# Patient Record
Sex: MALE | Race: WHITE | URBAN - METROPOLITAN AREA
[De-identification: names, ages, dates, MRNs, and addresses within clinical notes are randomized per-mention and may not be internally consistent; named-entity substitution may affect disease eponyms.]

---

## 2017-01-23 ENCOUNTER — TELEPHONE (OUTPATIENT)
Dept: FAMILY MEDICINE | Facility: CLINIC | Age: 62
End: 2017-01-23

## 2017-01-23 NOTE — Clinical Note
[facility]  [address]  [facility] [id number] [phone]                                                                                                           Sinai Ruiz  [address] [facility] [address]    January 23, 2017          Dear Sinai Ruiz,      We care about your well-being!  In order to ensure we are providing the best quality care, we have reviewed your chart and see that you are due for:     _____ Physical   _____ Pap Smear   _____ Mammogram   ___x__ Diabetes Followup   _____ Hypertension Followup   _____ Cholesterol Followup (Provider Appointment)   _____ Cholesterol Test (Lab-Only Appointment)   ___x__ Other:  colonoscopy     We can assist you in scheduling these appointments at [phone].    If you have had (or plan to have) any of these tests done at a facility other than [facility] or a [facility], please have the results from these tests sent to your primary physician at [facility].                 Sincerely,    SAMEER Mcmillan

## 2017-01-23 NOTE — TELEPHONE ENCOUNTER
Panel Management Review      Patient has the following on his problem list:     Diabetes    ASA: Failed    Last A1C  A1C      6.3   1/19/2016  A1C      5.9   9/26/2014  A1C      6.5   8/19/2013  A1C      6.2   5/18/2012  A1C      6.5   11/1/2011  A1C tested: no goal listed    Last LDL:    CHOL      204   1/19/2016  HDL       57   1/19/2016  LDL      113   1/19/2016  LDL    137.2   5/18/2012  TRIG      169   1/19/2016  CHOLHDLRATIO      3.5   9/26/2014  NHDL      147   1/19/2016    Is the patient on a Statin? NO             Is the patient on Aspirin? NO    Medications     HMG CoA Reductase Inhibitors    STATIN NOT PRESCRIBED, INTENTIONAL,          Last three blood pressure readings:  BP Readings from Last 3 Encounters:   01/19/16 142/86   09/26/14 122/80   08/19/13 116/70       Date of last diabetes office visit: 1/19/16     Tobacco History:     History   Smoking status     Never Smoker    Smokeless tobacco     Never Used           Hypertension   Last three blood pressure readings:  BP Readings from Last 3 Encounters:   01/19/16 142/86   09/26/14 122/80   08/19/13 116/70     Blood pressure: no goal listed    HTN Guidelines:  Age 18-59 BP range:  Less than 140/90  Age 60-85 with Diabetes:  Less than 140/90  Age 60-85 without Diabetes:  less than 150/90      Composite cancer screening  Chart review shows that this patient is due/due soon for the following Colonoscopy  Summary:    Patient is due/failing the following:   Diabetes check, A1C, BP CHECK and COLONOSCOPY    Action needed:   Patient needs office visit for diabetes visit. and Patient needs referral/order: colonoscopy    Type of outreach:    Sent letter.    Questions for provider review:    None                                                                                                                                    [facility]       Chart routed to self .

## 2017-04-05 DIAGNOSIS — I10 ESSENTIAL HYPERTENSION: ICD-10-CM

## 2017-04-05 NOTE — TELEPHONE ENCOUNTER
lisinopril-hydrochlorothiazide (PRINZIDE,ZESTORETIC) 20-12.5 MG per tablet      Last Written Prescription Date: 1/19/16  Last Fill Quantity: 180, # refills: 4  Last Office Visit with [facility], [facility] or [facility] prescribing provider: 1/19/16  Next 5 appointments (look out 90 days)     Apr 07, 2017  8:00 AM CDT   SHORT with Gabino Moore MD   [facility]    [address] [place] [id number] [phone]                   Potassium   Date Value Ref Range Status   01/19/2016 4.0 3.4 - 5.3 mmol/L Final     Creatinine   Date Value Ref Range Status   01/19/2016 0.90 0.66 - 1.25 mg/dL Final     BP Readings from Last 3 Encounters:   01/19/16 142/86   09/26/14 122/80   08/19/13 116/70

## 2017-04-06 RX ORDER — LISINOPRIL AND HYDROCHLOROTHIAZIDE 12.5; 2 MG/1; MG/1
TABLET ORAL
Qty: 60 TABLET | Refills: 0 | Status: SHIPPED | OUTPATIENT
Start: 2017-04-06 | End: 2017-04-07

## 2017-04-07 ENCOUNTER — OFFICE VISIT (OUTPATIENT)
Dept: FAMILY MEDICINE | Facility: CLINIC | Age: 62
End: 2017-04-07
Payer: COMMERCIAL

## 2017-04-07 VITALS
RESPIRATION RATE: 14 BRPM | HEIGHT: 73 IN | TEMPERATURE: 97.4 F | BODY MASS INDEX: 31.01 KG/M2 | DIASTOLIC BLOOD PRESSURE: 68 MMHG | OXYGEN SATURATION: 98 % | SYSTOLIC BLOOD PRESSURE: 124 MMHG | HEART RATE: 68 BPM | WEIGHT: 234 LBS

## 2017-04-07 DIAGNOSIS — E11.9 TYPE 2 DIABETES MELLITUS WITHOUT COMPLICATION, WITHOUT LONG-TERM CURRENT USE OF INSULIN (H): Primary | Chronic | ICD-10-CM

## 2017-04-07 DIAGNOSIS — Z23 ENCOUNTER FOR IMMUNIZATION: ICD-10-CM

## 2017-04-07 DIAGNOSIS — I10 ESSENTIAL HYPERTENSION: ICD-10-CM

## 2017-04-07 LAB
ALT SERPL W P-5'-P-CCNC: 26 U/L (ref 0–70)
ANION GAP SERPL CALCULATED.3IONS-SCNC: 7 MMOL/L (ref 3–14)
AST SERPL W P-5'-P-CCNC: 18 U/L (ref 0–45)
BUN SERPL-MCNC: 20 MG/DL (ref 7–30)
CALCIUM SERPL-MCNC: 9.4 MG/DL (ref 8.5–10.1)
CHLORIDE SERPL-SCNC: 108 MMOL/L (ref 94–109)
CHOLEST SERPL-MCNC: 215 MG/DL
CO2 SERPL-SCNC: 28 MMOL/L (ref 20–32)
CREAT SERPL-MCNC: 0.84 MG/DL (ref 0.66–1.25)
CREAT UR-MCNC: 108 MG/DL
GFR SERPL CREATININE-BSD FRML MDRD: ABNORMAL ML/MIN/1.7M2
GLUCOSE SERPL-MCNC: 124 MG/DL (ref 70–99)
HBA1C MFR BLD: 6.3 % (ref 4.3–6)
HDLC SERPL-MCNC: 52 MG/DL
LDLC SERPL CALC-MCNC: 138 MG/DL
MICROALBUMIN UR-MCNC: 7 MG/L
MICROALBUMIN/CREAT UR: 6.04 MG/G CR (ref 0–17)
NONHDLC SERPL-MCNC: 163 MG/DL
POTASSIUM SERPL-SCNC: 4 MMOL/L (ref 3.4–5.3)
SODIUM SERPL-SCNC: 143 MMOL/L (ref 133–144)
TRIGL SERPL-MCNC: 124 MG/DL

## 2017-04-07 PROCEDURE — 83036 HEMOGLOBIN GLYCOSYLATED A1C: CPT | Performed by: FAMILY MEDICINE

## 2017-04-07 PROCEDURE — 99214 OFFICE O/P EST MOD 30 MIN: CPT | Mod: 25 | Performed by: FAMILY MEDICINE

## 2017-04-07 PROCEDURE — 84450 TRANSFERASE (AST) (SGOT): CPT | Performed by: FAMILY MEDICINE

## 2017-04-07 PROCEDURE — 82043 UR ALBUMIN QUANTITATIVE: CPT | Performed by: FAMILY MEDICINE

## 2017-04-07 PROCEDURE — 80048 BASIC METABOLIC PNL TOTAL CA: CPT | Performed by: FAMILY MEDICINE

## 2017-04-07 PROCEDURE — 36415 COLL VENOUS BLD VENIPUNCTURE: CPT | Performed by: FAMILY MEDICINE

## 2017-04-07 PROCEDURE — 90715 TDAP VACCINE 7 YRS/> IM: CPT | Performed by: FAMILY MEDICINE

## 2017-04-07 PROCEDURE — 80061 LIPID PANEL: CPT | Performed by: FAMILY MEDICINE

## 2017-04-07 PROCEDURE — 84460 ALANINE AMINO (ALT) (SGPT): CPT | Performed by: FAMILY MEDICINE

## 2017-04-07 PROCEDURE — 90471 IMMUNIZATION ADMIN: CPT | Performed by: FAMILY MEDICINE

## 2017-04-07 RX ORDER — LISINOPRIL AND HYDROCHLOROTHIAZIDE 12.5; 2 MG/1; MG/1
TABLET ORAL
Qty: 90 TABLET | Refills: 3 | Status: SHIPPED | OUTPATIENT
Start: 2017-04-07

## 2017-04-07 NOTE — PROGRESS NOTES
"  SUBJECTIVE:                                                    Sinai Ruiz is a 61 year old male who presents to clinic today for the following health issues:          Diabetes Follow-up      Patient is checking blood sugars: not at all    Diabetic concerns: None     Symptoms of hypoglycemia (low blood sugar): none     Paresthesias (numbness or burning in feet) or sores: No     Date of last diabetic eye exam: Needs to schedule     Hyperlipidemia Follow-Up      Rate your low fat/cholesterol diet?: good    Taking statin?  No, had side effects from Simvastatin     Other lipid medications/supplements?:  none     Hypertension Follow-up      Outpatient blood pressures are not being checked.    Low Salt Diet: no added salt         Amount of exercise or physical activity: 1 day a week    Problems taking medications regularly: No    Medication side effects: none    Diet: low salt and low fat/cholesterol          Problem list and histories reviewed & adjusted, as indicated.  Additional history: as documented    Labs reviewed in EPIC    Reviewed and updated as needed this visit by clinical staff  Tobacco  Allergies  Meds  Med Hx  Surg Hx  Fam Hx  Soc Hx      Reviewed and updated as needed this visit by Provider         ROS:  CONSTITUTIONAL:NEGATIVE for fever, chills, change in weight  RESP:NEGATIVE for significant cough or SOB  CV: NEGATIVE for chest pain, palpitations or peripheral edema  ENDOCRINE: NEGATIVE for temperature intolerance, skin/hair changes and POSITIVE  for HX diabetes    OBJECTIVE:                                                    /68 (BP Location: Right arm, Patient Position: Chair, Cuff Size: Adult Large)  Pulse 68  Temp 97.4  F (36.3  C) (Tympanic)  Resp 14  Ht 6' 1\" (1.854 m)  Wt 234 lb (106.1 kg)  SpO2 98%  BMI 30.87 kg/m2  Body mass index is 30.87 kg/(m^2).  GENERAL APPEARANCE: healthy, alert and no distress  NECK: no adenopathy, no asymmetry, masses, or scars, thyroid normal to " palpation and no bruits  RESP: lungs clear to auscultation - no rales, rhonchi or wheezes  CV: regular rates and rhythm, normal S1 S2, no S3 or S4 and no murmur, click or rub  MS: extremities normal- no gross deformities noted    Diagnostic test results:  Lab: see below, results pending     ASSESSMENT/PLAN:                                                        ICD-10-CM    1. Type 2 diabetes mellitus without complication, without long-term current use of insulin (H) E11.9 Hemoglobin A1c     Basic metabolic panel     Lipid panel reflex to direct LDL     ALT     AST     Albumin Random Urine Quantitative   2. Essential hypertension I10 lisinopril-hydrochlorothiazide (PRINZIDE/ZESTORETIC) 20-12.5 MG per tablet   3. Encounter for immunization Z23 TDAP VACCINE (ADACEL)     VACCINE ADMINISTRATION, INITIAL       Follow up with Provider - 1 yr   Patient Instructions   Watch diet, work on dropping 5-10 # over next 6 mo      Gabino Moore MD  [facility]

## 2017-04-07 NOTE — MR AVS SNAPSHOT
"              After Visit Summary   2017    Sinai Ruiz    MRN: [id number]           Patient Information     Date Of Birth          1955        Visit Information        Provider Department      2017 8:00 AM Gabino Moore MD [facility]        Today's Diagnoses     Type 2 diabetes mellitus without complication, without long-term current use of insulin (H)    -  1    Essential hypertension        Encounter for immunization           Follow-ups after your visit        Who to contact     If you have questions or need follow up information about today's clinic visit or your schedule please contact [facility] directly at [phone].  Normal or non-critical lab and imaging results will be communicated to you by MyChart, letter or phone within 4 business days after the clinic has received the results. If you do not hear from us within 7 days, please contact the clinic through MyChart or phone. If you have a critical or abnormal lab result, we will notify you by phone as soon as possible.  Submit refill requests through [facility] or call your pharmacy and they will forward the refill request to us. Please allow 3 business days for your refill to be completed.          Additional Information About Your Visit        MyChart Information     [facility] lets you send messages to your doctor, view your test results, renew your prescriptions, schedule appointments and more. To sign up, go to [email or web address] . Click on \"Log in\" on the left side of the screen, which will take you to the Welcome page. Then click on \"Sign up Now\" on the right side of the page.     You will be asked to enter the access code listed below, as well as some personal information. Please follow the directions to create your username and password.     Your access code is: [email or web address]  Expires: 2017  8:22 AM     Your access code will  in 90 days. If you need help or a " "new code, please call your [facility] clinic or [phone].        Care EveryWhere ID     This is your Care EveryWhere ID. This could be used by other organizations to access your [facility] medical records  [id number]        Your Vitals Were     Pulse Temperature Respirations Height Pulse Oximetry BMI (Body Mass Index)    68 97.4  F (36.3  C) (Tympanic) 14 6' 1\" (1.854 m) 98% 30.87 kg/m2       Blood Pressure from Last 3 Encounters:   04/07/17 124/68   01/19/16 142/86   09/26/14 122/80    Weight from Last 3 Encounters:   04/07/17 234 lb (106.1 kg)   01/19/16 232 lb 5 oz (105.4 kg)   09/26/14 210 lb (95.3 kg)              We Performed the Following     Albumin Random Urine Quantitative     ALT     AST     Basic metabolic panel     Hemoglobin A1c     Lipid panel reflex to direct LDL     TDAP VACCINE (ADACEL)     VACCINE ADMINISTRATION, INITIAL          Today's Medication Changes          These changes are accurate as of: 4/7/17  8:22 AM.  If you have any questions, ask your nurse or doctor.               These medicines have changed or have updated prescriptions.        Dose/Directions    lisinopril-hydrochlorothiazide 20-12.5 MG per tablet   Commonly known as:  PRINZIDE/ZESTORETIC   This may have changed:  See the new instructions.   Used for:  Essential hypertension   Changed by:  Gabino Moore MD        TAKE 2 TABLETS BY MOUTH DAILY   Quantity:  90 tablet   Refills:  3            Where to get your medicines      These medications were sent to [facility]/pharmacy [address] [place], [place] - [address] [address]     Phone:  [phone]     lisinopril-hydrochlorothiazide 20-12.5 MG per tablet                Primary Care Provider Office Phone # Fax #    Nicole Joy Siegler, PA-C [phone]       [facility] [address] [facility] [address]  [facility] [address]        Thank you!     Thank you for choosing [facility]  for your care. " Our goal is always to provide you with excellent care. Hearing back from our patients is one way we can continue to improve our services. Please take a few minutes to complete the written survey that you may receive in the mail after your visit with us. Thank you!             Your Updated Medication List - Protect others around you: Learn how to safely use, store and throw away your medicines at www.disposemymeds.org.          This list is accurate as of: 4/7/17  8:22 AM.  Always use your most recent med list.                   Brand Name Dispense Instructions for use    lisinopril-hydrochlorothiazide 20-12.5 MG per tablet    PRINZIDE/ZESTORETIC    90 tablet    TAKE 2 TABLETS BY MOUTH DAILY       STATIN NOT PRESCRIBED (INTENTIONAL)     0 each    Statin not prescribed intentionally due to Intolerance (with supporting documentation of trying a statin at least once within the last 5 years)  Simvastatin - GI upset

## 2017-04-07 NOTE — NURSING NOTE
"Chief Complaint   Patient presents with     Diabetes     Recheck     Lipids     Hypertension       Initial /68 (BP Location: Right arm, Patient Position: Chair, Cuff Size: Adult Large)  Pulse 68  Temp 97.4  F (36.3  C) (Tympanic)  Resp 14  Ht 6' 1\" (1.854 m)  Wt 234 lb (106.1 kg)  SpO2 98%  BMI 30.87 kg/m2 Estimated body mass index is 30.87 kg/(m^2) as calculated from the following:    Height as of this encounter: 6' 1\" (1.854 m).    Weight as of this encounter: 234 lb (106.1 kg).  Medication Reconciliation: complete     Miranda Whyte LPN    Screening Questionnaire for Adult Immunization  Prior to injection verified patient identity using patient's name and date of birth.  Are you sick today?   No   Do you have allergies to medications, food, a vaccine component or latex?   No   Have you ever had a serious reaction after receiving a vaccination?   No   Do you have a long-term health problem with heart disease, lung disease, asthma, kidney disease, metabolic disease (e.g. diabetes), anemia, or other blood disorder?   No   Do you have cancer, leukemia, HIV/AIDS, or any other immune system problem?   No   In the past 3 months, have you taken medications that affect  your immune system, such as prednisone, other steroids, or anticancer drugs; drugs for the treatment of rheumatoid arthritis, Crohn s disease, or psoriasis; or have you had radiation treatments?   No   Have you had a seizure, or a brain or other nervous system problem?   No   During the past year, have you received a transfusion of blood or blood     products, or been given immune (gamma) globulin or antiviral drug?   No   For women: Are you pregnant or is there a chance you could become        pregnant during the next month?   No   Have you received any vaccinations in the past 4 weeks?   No     Immunization questionnaire answers were all negative.      MNVFC doesn't apply on this patient    Per orders of Dr. Moore, injection of Adacel was  " given by Miranda Kohli. Patient instructed to remain in clinic for 20 minutes afterwards, and to report any adverse reaction to me immediately.       Screening performed by Miranda Kohli on 4/7/2017 at 8:32 AM.

## 2017-04-07 NOTE — LETTER
[facility]  [address]  [address]  [facility] [id number] [phone]                                                                                                           Sinai Ruiz  [address] [facility] [address]    April 10, 2017      Dear Sinai,    The results of your recent tests were reviewed and are enclosed.     Known diabetes, good control.  Hemoglobin A1c in goal  Known diabetes good control.  Metabolic panel shows glucose mildly elevated  Albumin level in urine is normal  Cholesterol levels, total and LDL up from last test 1 year ago.  Keep watching cholesterol intake.  Keep Omega 3 fatty acid intake high  Liver tests are normal  Results for orders placed or performed in visit on 04/07/17   Hemoglobin A1c   Result Value Ref Range    Hemoglobin A1C 6.3 (H) 4.3 - 6.0 %   Basic metabolic panel   Result Value Ref Range    Sodium 143 133 - 144 mmol/L    Potassium 4.0 3.4 - 5.3 mmol/L    Chloride 108 94 - 109 mmol/L    Carbon Dioxide 28 20 - 32 mmol/L    Anion Gap 7 3 - 14 mmol/L    Glucose 124 (H) 70 - 99 mg/dL    Urea Nitrogen 20 7 - 30 mg/dL    Creatinine 0.84 0.66 - 1.25 mg/dL    GFR Estimate >90  Non  GFR Calc   >60 mL/min/1.7m2    GFR Estimate If Black >90   GFR Calc   >60 mL/min/1.7m2    Calcium 9.4 8.5 - 10.1 mg/dL   Lipid panel reflex to direct LDL   Result Value Ref Range    Cholesterol 215 (H) <200 mg/dL    Triglycerides 124 <150 mg/dL    HDL Cholesterol 52 >39 mg/dL    LDL Cholesterol Calculated 138 (H) <100 mg/dL    Non HDL Cholesterol 163 (H) <130 mg/dL   ALT   Result Value Ref Range    ALT 26 0 - 70 U/L   AST   Result Value Ref Range    AST 18 0 - 45 U/L   Albumin Random Urine Quantitative   Result Value Ref Range    Creatinine Urine 108 mg/dL    Albumin Urine mg/L 7 mg/L    Albumin Urine mg/g Cr 6.04 0 - 17 mg/g Cr         Thank you for choosing [facility].  We  appreciate the opportunity to serve you and look forward to supporting your healthcare needs in the future.    If you have any questions or concerns, please call me or my staff at [phone].      Sincerely,    Gabino Moore MD